# Patient Record
Sex: FEMALE | Race: WHITE | Employment: OTHER | URBAN - METROPOLITAN AREA
[De-identification: names, ages, dates, MRNs, and addresses within clinical notes are randomized per-mention and may not be internally consistent; named-entity substitution may affect disease eponyms.]

---

## 2023-08-08 ENCOUNTER — HOSPITAL ENCOUNTER (EMERGENCY)
Age: 53
Discharge: HOME | End: 2023-08-12
Attending: EMERGENCY MEDICINE
Payer: MEDICAID

## 2023-08-08 DIAGNOSIS — R45.851 SUICIDAL IDEATION: Primary | ICD-10-CM

## 2023-08-08 LAB
ALBUMIN SERPL-MCNC: 4.1 G/DL (ref 3.5–5.2)
ALBUMIN SERPL-MCNC: 4.5 G/DL (ref 3.5–5.2)
ALP SERPL-CCNC: 88 U/L (ref 35–104)
ALP SERPL-CCNC: 88 U/L (ref 35–104)
ALT SERPL-CCNC: 13 U/L (ref 0–32)
ALT SERPL-CCNC: 17 U/L (ref 0–32)
AMPHET UR QL SCN: POSITIVE
ANION GAP SERPL CALCULATED.3IONS-SCNC: 12 MMOL/L (ref 7–16)
ANION GAP SERPL CALCULATED.3IONS-SCNC: 17 MMOL/L (ref 7–16)
APAP SERPL-MCNC: <5 UG/ML (ref 10–30)
AST SERPL-CCNC: 18 U/L (ref 0–31)
AST SERPL-CCNC: 37 U/L (ref 0–31)
BARBITURATES UR QL SCN: NEGATIVE
BASOPHILS # BLD: 0.04 K/UL (ref 0–0.2)
BASOPHILS NFR BLD: 1 % (ref 0–2)
BENZODIAZ UR QL: NEGATIVE
BILIRUB SERPL-MCNC: 0.3 MG/DL (ref 0–1.2)
BILIRUB SERPL-MCNC: 0.4 MG/DL (ref 0–1.2)
BILIRUB UR QL STRIP: NEGATIVE
BUN SERPL-MCNC: 13 MG/DL (ref 6–20)
BUN SERPL-MCNC: 14 MG/DL (ref 6–20)
BUPRENORPHINE UR QL: NEGATIVE
CALCIUM SERPL-MCNC: 9 MG/DL (ref 8.6–10.2)
CALCIUM SERPL-MCNC: 9.1 MG/DL (ref 8.6–10.2)
CANNABINOIDS UR QL SCN: NEGATIVE
CHLORIDE SERPL-SCNC: 107 MMOL/L (ref 98–107)
CHLORIDE SERPL-SCNC: 108 MMOL/L (ref 98–107)
CLARITY UR: CLEAR
CO2 SERPL-SCNC: 17 MMOL/L (ref 22–29)
CO2 SERPL-SCNC: 24 MMOL/L (ref 22–29)
COCAINE UR QL SCN: POSITIVE
COLOR UR: YELLOW
CREAT SERPL-MCNC: 0.6 MG/DL (ref 0.5–1)
CREAT SERPL-MCNC: 0.7 MG/DL (ref 0.5–1)
EKG ATRIAL RATE: 75 BPM
EKG P AXIS: 83 DEGREES
EKG P-R INTERVAL: 172 MS
EKG Q-T INTERVAL: 386 MS
EKG QRS DURATION: 82 MS
EKG QTC CALCULATION (BAZETT): 485 MS
EKG R AXIS: 65 DEGREES
EKG T AXIS: 49 DEGREES
EKG VENTRICULAR RATE: 95 BPM
EOSINOPHIL # BLD: 0.07 K/UL (ref 0.05–0.5)
EOSINOPHILS RELATIVE PERCENT: 2 % (ref 0–6)
ERYTHROCYTE [DISTWIDTH] IN BLOOD BY AUTOMATED COUNT: 12.7 % (ref 11.5–15)
ETHANOLAMINE SERPL-MCNC: 59 MG/DL
FENTANYL UR QL: POSITIVE
GFR SERPL CREATININE-BSD FRML MDRD: >60 ML/MIN/1.73M2
GFR SERPL CREATININE-BSD FRML MDRD: >60 ML/MIN/1.73M2
GLUCOSE SERPL-MCNC: 87 MG/DL (ref 74–99)
GLUCOSE SERPL-MCNC: 99 MG/DL (ref 74–99)
GLUCOSE UR STRIP-MCNC: NEGATIVE MG/DL
HCT VFR BLD AUTO: 43.9 % (ref 34–48)
HGB BLD-MCNC: 14.9 G/DL (ref 11.5–15.5)
HGB UR QL STRIP.AUTO: NEGATIVE
IMM GRANULOCYTES # BLD AUTO: <0.03 K/UL (ref 0–0.58)
IMM GRANULOCYTES NFR BLD: 0 % (ref 0–5)
KETONES UR STRIP-MCNC: NEGATIVE MG/DL
LEUKOCYTE ESTERASE UR QL STRIP: NEGATIVE
LYMPHOCYTES NFR BLD: 1.82 K/UL (ref 1.5–4)
LYMPHOCYTES RELATIVE PERCENT: 39 % (ref 20–42)
MCH RBC QN AUTO: 31.2 PG (ref 26–35)
MCHC RBC AUTO-ENTMCNC: 33.9 G/DL (ref 32–34.5)
MCV RBC AUTO: 92 FL (ref 80–99.9)
METHADONE UR QL: NEGATIVE
MONOCYTES NFR BLD: 0.24 K/UL (ref 0.1–0.95)
MONOCYTES NFR BLD: 5 % (ref 2–12)
NEUTROPHILS NFR BLD: 53 % (ref 43–80)
NEUTS SEG NFR BLD: 2.47 K/UL (ref 1.8–7.3)
NITRITE UR QL STRIP: NEGATIVE
OPIATES UR QL SCN: NEGATIVE
OXYCODONE UR QL SCN: NEGATIVE
PCP UR QL SCN: NEGATIVE
PH UR STRIP: 6 [PH] (ref 5–9)
PLATELET # BLD AUTO: 247 K/UL (ref 130–450)
PMV BLD AUTO: 9.8 FL (ref 7–12)
POTASSIUM SERPL-SCNC: 3.7 MMOL/L (ref 3.5–5)
POTASSIUM SERPL-SCNC: 5.8 MMOL/L (ref 3.5–5)
PROT SERPL-MCNC: 7.1 G/DL (ref 6.4–8.3)
PROT SERPL-MCNC: 7.6 G/DL (ref 6.4–8.3)
PROT UR STRIP-MCNC: NEGATIVE MG/DL
RBC # BLD AUTO: 4.77 M/UL (ref 3.5–5.5)
SALICYLATES SERPL-MCNC: <0.3 MG/DL (ref 0–30)
SODIUM SERPL-SCNC: 141 MMOL/L (ref 132–146)
SODIUM SERPL-SCNC: 144 MMOL/L (ref 132–146)
SP GR UR STRIP: 1.01 (ref 1–1.03)
TEST INFORMATION: ABNORMAL
TOXIC TRICYCLIC SC,BLOOD: NEGATIVE
UROBILINOGEN UR STRIP-ACNC: 0.2 EU/DL (ref 0–1)
WBC OTHER # BLD: 4.7 K/UL (ref 4.5–11.5)

## 2023-08-08 PROCEDURE — 6360000002 HC RX W HCPCS: Performed by: PHYSICIAN ASSISTANT

## 2023-08-08 PROCEDURE — 80143 DRUG ASSAY ACETAMINOPHEN: CPT

## 2023-08-08 PROCEDURE — 96374 THER/PROPH/DIAG INJ IV PUSH: CPT

## 2023-08-08 PROCEDURE — 93005 ELECTROCARDIOGRAM TRACING: CPT | Performed by: EMERGENCY MEDICINE

## 2023-08-08 PROCEDURE — 80053 COMPREHEN METABOLIC PANEL: CPT

## 2023-08-08 PROCEDURE — 99285 EMERGENCY DEPT VISIT HI MDM: CPT

## 2023-08-08 PROCEDURE — 6360000002 HC RX W HCPCS: Performed by: EMERGENCY MEDICINE

## 2023-08-08 PROCEDURE — 80179 DRUG ASSAY SALICYLATE: CPT

## 2023-08-08 PROCEDURE — 6370000000 HC RX 637 (ALT 250 FOR IP): Performed by: EMERGENCY MEDICINE

## 2023-08-08 PROCEDURE — 84703 CHORIONIC GONADOTROPIN ASSAY: CPT

## 2023-08-08 PROCEDURE — G0480 DRUG TEST DEF 1-7 CLASSES: HCPCS

## 2023-08-08 PROCEDURE — 81001 URINALYSIS AUTO W/SCOPE: CPT

## 2023-08-08 PROCEDURE — 85025 COMPLETE CBC W/AUTO DIFF WBC: CPT

## 2023-08-08 PROCEDURE — 93010 ELECTROCARDIOGRAM REPORT: CPT | Performed by: INTERNAL MEDICINE

## 2023-08-08 PROCEDURE — 80307 DRUG TEST PRSMV CHEM ANLYZR: CPT

## 2023-08-08 PROCEDURE — 96375 TX/PRO/DX INJ NEW DRUG ADDON: CPT

## 2023-08-08 RX ORDER — KETOROLAC TROMETHAMINE 30 MG/ML
30 INJECTION, SOLUTION INTRAMUSCULAR; INTRAVENOUS ONCE
Status: COMPLETED | OUTPATIENT
Start: 2023-08-08 | End: 2023-08-08

## 2023-08-08 RX ORDER — LORAZEPAM 2 MG/ML
1 INJECTION INTRAMUSCULAR ONCE
Status: COMPLETED | OUTPATIENT
Start: 2023-08-08 | End: 2023-08-08

## 2023-08-08 RX ORDER — ONDANSETRON 2 MG/ML
4 INJECTION INTRAMUSCULAR; INTRAVENOUS ONCE
Status: COMPLETED | OUTPATIENT
Start: 2023-08-08 | End: 2023-08-08

## 2023-08-08 RX ORDER — ONDANSETRON 4 MG/1
4 TABLET, ORALLY DISINTEGRATING ORAL ONCE
Status: DISCONTINUED | OUTPATIENT
Start: 2023-08-08 | End: 2023-08-08

## 2023-08-08 RX ORDER — ACETAMINOPHEN 325 MG/1
650 TABLET ORAL ONCE
Status: COMPLETED | OUTPATIENT
Start: 2023-08-08 | End: 2023-08-08

## 2023-08-08 RX ORDER — KETOROLAC TROMETHAMINE 30 MG/ML
30 INJECTION, SOLUTION INTRAMUSCULAR; INTRAVENOUS ONCE
Status: DISCONTINUED | OUTPATIENT
Start: 2023-08-08 | End: 2023-08-08

## 2023-08-08 RX ORDER — CLONIDINE HYDROCHLORIDE 0.1 MG/1
0.1 TABLET ORAL ONCE
Status: COMPLETED | OUTPATIENT
Start: 2023-08-08 | End: 2023-08-08

## 2023-08-08 RX ADMIN — ONDANSETRON 4 MG: 2 INJECTION INTRAMUSCULAR; INTRAVENOUS at 05:40

## 2023-08-08 RX ADMIN — LORAZEPAM 1 MG: 2 INJECTION INTRAMUSCULAR; INTRAVENOUS at 19:04

## 2023-08-08 RX ADMIN — CLONIDINE HYDROCHLORIDE 0.1 MG: 0.1 TABLET ORAL at 05:41

## 2023-08-08 RX ADMIN — KETOROLAC TROMETHAMINE 30 MG: 30 INJECTION, SOLUTION INTRAMUSCULAR; INTRAVENOUS at 05:40

## 2023-08-08 RX ADMIN — ACETAMINOPHEN 650 MG: 325 TABLET ORAL at 13:08

## 2023-08-08 ASSESSMENT — LIFESTYLE VARIABLES: HOW OFTEN DO YOU HAVE A DRINK CONTAINING ALCOHOL: MONTHLY OR LESS

## 2023-08-08 ASSESSMENT — PAIN DESCRIPTION - LOCATION
LOCATION: GENERALIZED

## 2023-08-08 ASSESSMENT — PAIN SCALES - GENERAL
PAINLEVEL_OUTOF10: 10

## 2023-08-08 ASSESSMENT — PAIN DESCRIPTION - DESCRIPTORS: DESCRIPTORS: BURNING
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
## 2023-08-08 NOTE — ED NOTES
CO at bedside patient in bed eyes closed, respirations unlabored, no signs of distress     Ivelisse Kaur [place]  08/08/23 3759
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
## 2023-08-08 NOTE — ED NOTES
Patient states she does not have a plan specifically to kill herself but that she just wants to die and doesn't have enough energy to try anymore     Bulmaro Lima RN  08/08/23 4295

## 2023-08-08 NOTE — ED NOTES
Pt is a 45 yo female who presents to the ED reporting daily suicidal thoughts and was pink slipped by the ED physician due to repeated statements that she wants to die and she has nothing to live for.

## 2023-08-08 NOTE — CARE COORDINATION
Social Work/Transition of Care:     Pt presented to the ED for Evaluation pt was pinkslipped by the ED PCP and has signed the consent for TeleHealth. SW called VIANNEY spoke with [facility] notified of pt in the ED and in need Telehealth assessment. BRADY faxed pinkslip and consent, confirmation received.     Electronically signed by Namita Sauceda on 4/0/1997 at 10:55 AM

## 2023-08-09 LAB
CK SERPL-CCNC: 56 U/L (ref 20–180)
HCG UR QL: NEGATIVE

## 2023-08-09 PROCEDURE — 96372 THER/PROPH/DIAG INJ SC/IM: CPT

## 2023-08-09 PROCEDURE — 82550 ASSAY OF CK (CPK): CPT

## 2023-08-09 PROCEDURE — 6370000000 HC RX 637 (ALT 250 FOR IP): Performed by: EMERGENCY MEDICINE

## 2023-08-09 PROCEDURE — 6360000002 HC RX W HCPCS: Performed by: EMERGENCY MEDICINE

## 2023-08-09 RX ORDER — DIPHENHYDRAMINE HCL 25 MG
50 TABLET ORAL EVERY 6 HOURS PRN
Status: DISCONTINUED | OUTPATIENT
Start: 2023-08-09 | End: 2023-08-12 | Stop reason: HOSPADM

## 2023-08-09 RX ORDER — LORAZEPAM 1 MG/1
1 TABLET ORAL ONCE
Status: COMPLETED | OUTPATIENT
Start: 2023-08-09 | End: 2023-08-09

## 2023-08-09 RX ORDER — IBUPROFEN 600 MG/1
600 TABLET ORAL ONCE
Status: COMPLETED | OUTPATIENT
Start: 2023-08-09 | End: 2023-08-09

## 2023-08-09 RX ORDER — LORAZEPAM 1 MG/1
2 TABLET ORAL ONCE
Status: COMPLETED | OUTPATIENT
Start: 2023-08-09 | End: 2023-08-09

## 2023-08-09 RX ORDER — KETOROLAC TROMETHAMINE 30 MG/ML
60 INJECTION, SOLUTION INTRAMUSCULAR; INTRAVENOUS ONCE
Status: COMPLETED | OUTPATIENT
Start: 2023-08-09 | End: 2023-08-09

## 2023-08-09 RX ORDER — ACETAMINOPHEN 500 MG
1000 TABLET ORAL ONCE
Status: COMPLETED | OUTPATIENT
Start: 2023-08-09 | End: 2023-08-09

## 2023-08-09 RX ORDER — NICOTINE 21 MG/24HR
1 PATCH, TRANSDERMAL 24 HOURS TRANSDERMAL DAILY
Status: DISCONTINUED | OUTPATIENT
Start: 2023-08-09 | End: 2023-08-12 | Stop reason: HOSPADM

## 2023-08-09 RX ADMIN — LORAZEPAM 2 MG: 1 TABLET ORAL at 19:19

## 2023-08-09 RX ADMIN — LORAZEPAM 2 MG: 1 TABLET ORAL at 22:41

## 2023-08-09 RX ADMIN — ACETAMINOPHEN 1000 MG: 500 TABLET ORAL at 17:35

## 2023-08-09 RX ADMIN — KETOROLAC TROMETHAMINE 60 MG: 30 INJECTION, SOLUTION INTRAMUSCULAR at 17:34

## 2023-08-09 RX ADMIN — IBUPROFEN 600 MG: 600 TABLET, FILM COATED ORAL at 17:16

## 2023-08-09 RX ADMIN — LORAZEPAM 1 MG: 1 TABLET ORAL at 12:18

## 2023-08-09 RX ADMIN — DIPHENHYDRAMINE HCL 50 MG: 25 TABLET ORAL at 19:19

## 2023-08-09 ASSESSMENT — PAIN DESCRIPTION - ORIENTATION
ORIENTATION: LEFT
ORIENTATION: RIGHT
ORIENTATION: LEFT

## 2023-08-09 ASSESSMENT — PAIN SCALES - GENERAL
PAINLEVEL_OUTOF10: 7
PAINLEVEL_OUTOF10: 9
PAINLEVEL_OUTOF10: 7

## 2023-08-09 ASSESSMENT — PAIN DESCRIPTION - LOCATION
LOCATION: HIP

## 2023-08-09 ASSESSMENT — PAIN DESCRIPTION - DESCRIPTORS: DESCRIPTORS: ACHING

## 2023-08-09 NOTE — ED NOTES
SW was notified of Pt being a self pay with no health insurance.  BRADY will follow up on the protocol for out of state indigent patients, for bed day approval.      Dino Patel, EMELYN, JUDITHW  08/09/23 0136

## 2023-08-09 NOTE — CARE COORDINATION
Social Work/Transition of Care:     BRADY spoke with VIANNEY Duncan to follow up on pt disposition, per Kathy Duncan pt is on the waitlist for [facility] AN AFFILIATE OF [facility] will continue to follow and assist with disposition.     Electronically signed by Eduardo Leal on 3/0/2183 at 11:20 AM

## 2023-08-09 NOTE — ED NOTES
SANE kit given to [employer]. Relinquished custody of SANE kit at this time.        Zena Rosario RN  08/08/23 6294

## 2023-08-09 NOTE — ED NOTES
BRADY asked [place] registration to assist to see if insurance in verified. Tresa Dakin, MSW, [place]  08/09/23 9343    SW confirmed with SEY and SEB registration that Pt has no insurance after [facility].       Tresa Dakin, MSW, [place]  08/09/23 3298

## 2023-08-09 NOTE — ED NOTES
The following labs were labeled with appropriate pt sticker and tubed to lab:     [] Blue     [] Lavender   [] on ice  [x] Green/yellow  [] Green/black [] on ice  [] Juaquin Leech  [] on ice  [] Yellow  [] Red  [] Type/ Screen  [] ABG  [] VBG    [] COVID-19 swab    [] Rapid  [] PCR  [] Flu swab  [] Peds Viral Panel     [] Urine Sample  [] Fecal Sample  [] Pelvic Cultures  [] Blood Cultures  [] X 2  [] STREP Cultures       Suyapa Cadet RN  08/09/23 9583

## 2023-08-09 NOTE — ED NOTES
Pt tearful, states she needs some psych meds. Needs something to calm her down. Feels anxious. Dr. Luceroreld Labs aware.       Avis Otoole  08/09/23 1914

## 2023-08-09 NOTE — ED NOTES
Pt is a self pay with no health insurance and lives out of state in [place]. SW reached out to [facility] at [phone] for bed day approval. BRADY spoke to Carmen and provided demographic information. SW was advised to fax over chart information to [phone]. Awaiting a call back to speak to on call worker. EMELYN Fregoso, [place]  08/09/23 2018    BRADY received a call from on- Cassie Wyman [phone] and was given bed day approval for [facility]. EMELYN Fregoso, [facility]  08/09/23 4510    BRADY reached to [facility] at [phone] ext [phone] to inquire on bed availability. BRADY spoke to Tracey Walter who reported they have a pretty big waiting list. Unable to provide specific number due to not being in the office at this time. Referral information faxed to [phone].       EMELYN Fregoso, [place]  08/09/23 2136

## 2023-08-09 NOTE — ED NOTES
Access Center notified of referral for placement. Awaiting call back.      Floridalma Sarmiento RN  08/08/23 7478

## 2023-08-09 NOTE — ED NOTES
Pt requesting nicotine patch, Dr Mauro Jiménez notified and ordering. Pt resting in bed. Denies other needs.       Christi Garber RN  08/09/23 8408

## 2023-08-09 NOTE — ED NOTES
CALLED [facility] - MARK IS OUT ON THE UNITS, BUT SAVANNAH ADVISED THAT THEIR DOCTOR IS STILL REVIEWING THE CHART.     SAVANNAH ASKED FOR CK LEVEL - I FAXED IT TO [place], [place]  08/09/23 2026

## 2023-08-09 NOTE — ED NOTES
Called [facility] and spoke to Giovana, who advised that pt is under review with the doctor - expecting a call back later today with dispo.         Lorin Spain, LARA  08/09/23 1291

## 2023-08-09 NOTE — ED NOTES
5576 Kit opened  [id number] Exam started, exam completed by this RN, cervical exam done by Kadeem Dixon, [address] [place] [address].   1324 exam ended. This RN remained in patient room until all swabs were dry. Patient tolerated exam well. 1946 this RN left the room with the kit. RN copied necessary papers for patient record. 1950 SANE kit is sealed, [facility] notified.       Glenda Glasgow RN  08/08/23 2026

## 2023-08-10 LAB
APAP SERPL-MCNC: <5 UG/ML (ref 10–30)
ETHANOLAMINE SERPL-MCNC: 175 MG/DL
SALICYLATES SERPL-MCNC: 0.4 MG/DL (ref 0–30)
SARS-COV-2 RDRP RESP QL NAA+PROBE: NOT DETECTED
SPECIMEN DESCRIPTION: NORMAL
TOXIC TRICYCLIC SC,BLOOD: NEGATIVE

## 2023-08-10 PROCEDURE — 6370000000 HC RX 637 (ALT 250 FOR IP): Performed by: EMERGENCY MEDICINE

## 2023-08-10 PROCEDURE — 6370000000 HC RX 637 (ALT 250 FOR IP)

## 2023-08-10 PROCEDURE — 6370000000 HC RX 637 (ALT 250 FOR IP): Performed by: STUDENT IN AN ORGANIZED HEALTH CARE EDUCATION/TRAINING PROGRAM

## 2023-08-10 PROCEDURE — 87635 SARS-COV-2 COVID-19 AMP PRB: CPT

## 2023-08-10 RX ORDER — LANOLIN ALCOHOL/MO/W.PET/CERES
3 CREAM (GRAM) TOPICAL ONCE
Status: COMPLETED | OUTPATIENT
Start: 2023-08-10 | End: 2023-08-10

## 2023-08-10 RX ORDER — METHOCARBAMOL 500 MG/1
500 TABLET, FILM COATED ORAL ONCE
Status: COMPLETED | OUTPATIENT
Start: 2023-08-10 | End: 2023-08-10

## 2023-08-10 RX ORDER — LORAZEPAM 1 MG/1
2 TABLET ORAL ONCE
Status: COMPLETED | OUTPATIENT
Start: 2023-08-10 | End: 2023-08-10

## 2023-08-10 RX ORDER — HYDRALAZINE HYDROCHLORIDE 25 MG/1
25 TABLET, FILM COATED ORAL ONCE
Status: COMPLETED | OUTPATIENT
Start: 2023-08-10 | End: 2023-08-10

## 2023-08-10 RX ORDER — ONDANSETRON 4 MG/1
4 TABLET, ORALLY DISINTEGRATING ORAL ONCE
Status: COMPLETED | OUTPATIENT
Start: 2023-08-10 | End: 2023-08-10

## 2023-08-10 RX ORDER — ACETAMINOPHEN 325 MG/1
650 TABLET ORAL ONCE
Status: COMPLETED | OUTPATIENT
Start: 2023-08-10 | End: 2023-08-10

## 2023-08-10 RX ADMIN — HYDRALAZINE HYDROCHLORIDE 25 MG: 25 TABLET, FILM COATED ORAL at 22:38

## 2023-08-10 RX ADMIN — ONDANSETRON 4 MG: 4 TABLET, ORALLY DISINTEGRATING ORAL at 11:10

## 2023-08-10 RX ADMIN — ACETAMINOPHEN 650 MG: 325 TABLET ORAL at 02:41

## 2023-08-10 RX ADMIN — LORAZEPAM 2 MG: 1 TABLET ORAL at 17:02

## 2023-08-10 RX ADMIN — METHOCARBAMOL 500 MG: 500 TABLET ORAL at 23:37

## 2023-08-10 RX ADMIN — Medication 3 MG: at 23:38

## 2023-08-10 RX ADMIN — DIPHENHYDRAMINE HCL 50 MG: 25 TABLET ORAL at 11:10

## 2023-08-10 ASSESSMENT — PAIN DESCRIPTION - LOCATION
LOCATION: HIP
LOCATION: HIP

## 2023-08-10 ASSESSMENT — PAIN SCALES - GENERAL
PAINLEVEL_OUTOF10: 3
PAINLEVEL_OUTOF10: 9
PAINLEVEL_OUTOF10: 4
PAINLEVEL_OUTOF10: 10

## 2023-08-10 ASSESSMENT — PAIN DESCRIPTION - DESCRIPTORS
DESCRIPTORS: ACHING;DISCOMFORT
DESCRIPTORS: SHARP

## 2023-08-10 ASSESSMENT — PAIN DESCRIPTION - ORIENTATION: ORIENTATION: RIGHT;LEFT

## 2023-08-10 ASSESSMENT — PAIN - FUNCTIONAL ASSESSMENT: PAIN_FUNCTIONAL_ASSESSMENT: 0-10

## 2023-08-10 NOTE — ED NOTES
Updated information sent to [facility] to review.       EMELYN Osborn, [place]  08/10/23 8691

## 2023-08-10 NOTE — CARE COORDINATION
Social Work/Transition of Care:     BRADY spoke with VIANNEY Dailey in addition to [employer] Nurse Notified and will follow up.     Electronically signed by Nicholas Stout on 2/99/0382 at 7:12 PM

## 2023-08-10 NOTE — ED NOTES
I called [facility] to get an update - I left a detailed message requesting a call back with dispo decision     Kvng Anguiano, LARA  08/10/23 0825

## 2023-08-10 NOTE — ED NOTES
Call from Janak Donohue at [facility] ED requesting update on this pt's status. Call to [facility] [phone], ext [phone], spoke with Roel Mata who reports that pt is under review but psychiatrist is concerned about blood pressure readings. Spoke with Janak Donohue at [phone], relayed [facility] concerns about BP, Janak Donohue reports she will address with the ED doc.       [address] [facility],[address], MSW, [address] [place] [address]  08/10/23 9272

## 2023-08-10 NOTE — ED NOTES
Pt resting comfortably. Resp regular. Vitals are stable. Sitter remains at bedside. Pt updated on plan of care.       Georges Vega RN  08/10/23 5980

## 2023-08-10 NOTE — ED NOTES
Call from Patient Navigator Nael Us, requesting to know if this pt might possible have out-of state Medicaid from [place]. Call to KELSI APARICIO [facility] ED, spoke with [facility], requested that pt be asked if she has out-of-state Medicaid. [facility] reports pt states she has Medicare. Requested Carlyn ask Registration to review this pt's insurance status in light of her report she has Medicare. Informed pt navigator of status and request for Registration to review.       [address], [place], [place]  08/10/23 6163

## 2023-08-10 NOTE — ED NOTES
Spoke will BRADY LAIRD [facility] AN AFFILIATE OF [facility] # [id number]- reviewed [facility] Screening. Will have [place] call [facility] for re-eval for placement.      Lexx Kang RN  08/10/23 10 Rajani Collier RN  08/10/23 2243

## 2023-08-10 NOTE — ED NOTES
Report provided to [facility]. Pt continues to await placement. Questions answered during handoff.       Brynn Patricio RN  08/09/23 7095

## 2023-08-10 NOTE — ED NOTES
BRADY reached to [facility] at [phone] ext [phone] to inquire on bed availability. BRADY spoke to Chen Baum who reported their provider reviewed case at 6pm. It was requested that Pt be observed longer for detox symptoms. BRADY was informed this usually is for 24 hours. BRADY updated DARRON Santos.           Shruthi Mota, EMELYN, [facility]  08/10/23 2517

## 2023-08-10 NOTE — ED NOTES
Pt had shower, change of paper gown, new socks and new linens.       Bhavna Ledbetter RN  08/10/23 6345

## 2023-08-10 NOTE — ED NOTES
04:17 Clinical Opiate Withdrawal Scale Clinical Opiate Withdrawal Scale  Resting Pulse rate: 80 beats/min or below  GI upset over last 30 mins: No GI symptoms  Sweating over last 30 mins: No report of chills or flushing  Tremor observed in outreached hands: No tremor  Restlessness observed during assessment: Able to sit still  Yawning observed during assessment: 1 or 2 times  Pupil size: Pinned or normal size for room light  Anxiety or Irritability: None  Bone or joint aches: Mild diffuse discomfort (Pt states she needs a hip replacement. )  Gooseflesh skin: Skin is smooth  Running Nose or tearing: Not present  Clinical Opiate Withdrawal Scale Score: 2     06:10 Clinical Opiate Withdrawal Scale Clinical Opiate Withdrawal Scale  Resting Pulse rate: 80 beats/min or below  GI upset over last 30 mins: No GI symptoms  Sweating over last 30 mins: No report of chills or flushing  Tremor observed in outreached hands: No tremor  Restlessness observed during assessment: Able to sit still  Yawning observed during assessment: 1 or 2 times  Pupil size: Pinned or normal size for room light  Anxiety or Irritability: None  Bone or joint aches: Not present  Gooseflesh skin: Skin is smooth  Running Nose or tearing: Not present  Clinical Opiate Withdrawal Scale Score: 481 [place], [place]  08/10/23 0211

## 2023-08-10 NOTE — CARE COORDINATION
Social Work/Transition of Care:     UC received a call from [facility] requesting updated information, pt to be reviewed with Psychiatrist.    Electronically signed by LARA Gillette on 0/67/1787 at 1:21 PM     1700 SW called VIANNEY spoke to [facility] requested update on pts disposition to [facility].     Electronically signed by Ihsan Esquivel on 9/96/1675 at 5:05 PM

## 2023-08-10 NOTE — ED NOTES
Pt resting on bed, a+o x4, skin pink, warm dry, respirations even non labored. Pt denies needs. call light within reach.        Jayda Escobar RN  08/09/23 2049

## 2023-08-11 PROCEDURE — 6370000000 HC RX 637 (ALT 250 FOR IP): Performed by: EMERGENCY MEDICINE

## 2023-08-11 PROCEDURE — 6370000000 HC RX 637 (ALT 250 FOR IP): Performed by: STUDENT IN AN ORGANIZED HEALTH CARE EDUCATION/TRAINING PROGRAM

## 2023-08-11 PROCEDURE — 6360000002 HC RX W HCPCS: Performed by: PSYCHIATRY & NEUROLOGY

## 2023-08-11 PROCEDURE — 6360000002 HC RX W HCPCS: Performed by: STUDENT IN AN ORGANIZED HEALTH CARE EDUCATION/TRAINING PROGRAM

## 2023-08-11 RX ORDER — BUPRENORPHINE HYDROCHLORIDE 8 MG/1
8 TABLET SUBLINGUAL 2 TIMES DAILY
Status: DISCONTINUED | OUTPATIENT
Start: 2023-08-12 | End: 2023-08-12

## 2023-08-11 RX ORDER — DICYCLOMINE HYDROCHLORIDE 10 MG/1
20 CAPSULE ORAL EVERY 6 HOURS PRN
Status: DISCONTINUED | OUTPATIENT
Start: 2023-08-11 | End: 2023-08-12 | Stop reason: HOSPADM

## 2023-08-11 RX ORDER — BUPRENORPHINE HYDROCHLORIDE AND NALOXONE HYDROCHLORIDE DIHYDRATE 2; .5 MG/1; MG/1
2 TABLET SUBLINGUAL PRN
Status: DISCONTINUED | OUTPATIENT
Start: 2023-08-11 | End: 2023-08-11

## 2023-08-11 RX ORDER — CLONIDINE HYDROCHLORIDE 0.1 MG/1
0.1 TABLET ORAL EVERY 6 HOURS PRN
Status: DISCONTINUED | OUTPATIENT
Start: 2023-08-11 | End: 2023-08-12 | Stop reason: HOSPADM

## 2023-08-11 RX ORDER — BUPRENORPHINE HYDROCHLORIDE 8 MG/1
8 TABLET SUBLINGUAL 2 TIMES DAILY
Status: DISCONTINUED | OUTPATIENT
Start: 2023-08-11 | End: 2023-08-11

## 2023-08-11 RX ORDER — METHOCARBAMOL 750 MG/1
750 TABLET, FILM COATED ORAL EVERY 6 HOURS PRN
Status: DISCONTINUED | OUTPATIENT
Start: 2023-08-11 | End: 2023-08-12 | Stop reason: HOSPADM

## 2023-08-11 RX ORDER — LORAZEPAM 1 MG/1
2 TABLET ORAL ONCE
Status: COMPLETED | OUTPATIENT
Start: 2023-08-11 | End: 2023-08-11

## 2023-08-11 RX ORDER — LOPERAMIDE HYDROCHLORIDE 2 MG/1
2 CAPSULE ORAL 4 TIMES DAILY PRN
Status: DISCONTINUED | OUTPATIENT
Start: 2023-08-11 | End: 2023-08-12 | Stop reason: HOSPADM

## 2023-08-11 RX ORDER — LANOLIN ALCOHOL/MO/W.PET/CERES
3 CREAM (GRAM) TOPICAL NIGHTLY
Status: DISCONTINUED | OUTPATIENT
Start: 2023-08-11 | End: 2023-08-12 | Stop reason: HOSPADM

## 2023-08-11 RX ADMIN — CLONIDINE HYDROCHLORIDE 0.1 MG: 0.1 TABLET ORAL at 03:37

## 2023-08-11 RX ADMIN — BUPRENORPHINE HCL 8 MG: 8 TABLET SUBLINGUAL at 15:06

## 2023-08-11 RX ADMIN — LORAZEPAM 2 MG: 1 TABLET ORAL at 22:44

## 2023-08-11 RX ADMIN — BUPRENORPHINE HYDROCHLORIDE AND NALOXONE HYDROCHLORIDE DIHYDRATE 2 TABLET: 2; .5 TABLET SUBLINGUAL at 03:37

## 2023-08-11 RX ADMIN — DICYCLOMINE HYDROCHLORIDE 20 MG: 10 CAPSULE ORAL at 03:38

## 2023-08-11 RX ADMIN — DIPHENHYDRAMINE HCL 50 MG: 25 TABLET ORAL at 03:37

## 2023-08-11 ASSESSMENT — PAIN - FUNCTIONAL ASSESSMENT
PAIN_FUNCTIONAL_ASSESSMENT: 0-10
PAIN_FUNCTIONAL_ASSESSMENT: 0-10

## 2023-08-11 ASSESSMENT — LIFESTYLE VARIABLES
HOW MANY STANDARD DRINKS CONTAINING ALCOHOL DO YOU HAVE ON A TYPICAL DAY: 1 OR 2
HOW OFTEN DO YOU HAVE A DRINK CONTAINING ALCOHOL: MONTHLY OR LESS

## 2023-08-11 ASSESSMENT — PAIN SCALES - GENERAL
PAINLEVEL_OUTOF10: 10
PAINLEVEL_OUTOF10: 5

## 2023-08-11 ASSESSMENT — PAIN DESCRIPTION - LOCATION
LOCATION: HIP
LOCATION: HIP

## 2023-08-11 ASSESSMENT — PAIN DESCRIPTION - ORIENTATION
ORIENTATION: RIGHT
ORIENTATION: RIGHT

## 2023-08-11 ASSESSMENT — PAIN DESCRIPTION - PAIN TYPE: TYPE: CHRONIC PAIN

## 2023-08-11 ASSESSMENT — PAIN DESCRIPTION - DESCRIPTORS: DESCRIPTORS: ACHING;DISCOMFORT

## 2023-08-11 NOTE — CARE COORDINATION
Peer Recovery Support Note    Name: Dilia David  Date: 8/11/2023    Chief Complaint   Patient presents with    Hypertension     154/110 for EMS       Peer Support met with patient. [x] Support and education provided  [] Resources provided   [] Treatment referral:   [] Other:   [] Patient declined peer recovery services     Referred By: Jessica Zamora. Notes: Peer gave support to patient. She didn't know if she had her phone with her when she came in. All belonging were at the nurses station. Another staff member and peer went through her things to hopefully find her phone which we did not. Out of State insurance (medicaid) and no identification on her.      Elma Borjas, 8/11/2023

## 2023-08-11 NOTE — CARE COORDINATION
Social Work/Transition of Care:     BRADY spoke with Jena Kingston Peer support of pt in the ED.     Electronically signed by Ilda Johnson on 6/16/5075 at 10:35 AM

## 2023-08-11 NOTE — ED NOTES
Patient resting in bed with eyes closed. No signs of distress. 1:1 sitter remains at bedside.       Mel Blanc RN  08/11/23 9115

## 2023-08-11 NOTE — ED NOTES
Patient sleeping. Arouses easily. C/O sitting outside the room.  Continue to watch     Pool MondayDARRON  08/11/23 1364

## 2023-08-11 NOTE — ED NOTES
Took patient phone to talk to Dr Luc Mora. Patient calm and cooperative.  C/O sitting outside room     Sandro Jewell RN  08/11/23 1702

## 2023-08-11 NOTE — ED NOTES
Updated mom jeff on visit- listed in account. Gave some history drug use d/t health issue hip problem, prescribed pain medication and snow balled.  Her contact is in account [id number], RN  08/10/23 8556

## 2023-08-11 NOTE — ED NOTES
I CALLED [facility] AND SPOKE TO LUIZ TO CANCEL [address] [facility] AND UPDATED [address] [facility]  08/11/23 0972

## 2023-08-11 NOTE — CARE COORDINATION
Social Work/Transition of Care:     Pt in need of Treatment facility, ED SW along with peer support have initiated a referral to [facility] in [facility]# [phone] opt. 1,  SW faxed information to no. Provided [phone] for review, SW spoke with [place] who will contact ED if they are able to accept pt, facility will also provide transport. ED Charge Nurse notified.     Electronically signed by Letitia Reyes on 0/06/5300 at 5:48 PM

## 2023-08-11 NOTE — ED NOTES
Assumed care of patient at 7:30. Introduced self to patient as RN. Patient sleeping, easily aroused. Compliains of right hip pain 10/10. Patient denies any HI/SI. Skin warm /dry. No signs of withdrawals noted. C/O sitting outside room.      Iris Beckman RN  08/11/23 189 Rosario Cartwright RN  08/11/23 6603
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
## 2023-08-11 NOTE — ED NOTES
Spoke with Dr. Paxton Barlow about sitter need. Read through Dr. Kitchen Moni note and dicussed pink slip expiring. Patient no longer has sitter need. Sitter removed from bed side.        Jacky Guaman RN  08/11/23 0916

## 2023-08-11 NOTE — ED NOTES
Patient lying left side in bed. Quiet and awake. Respirations equal and unlabored. No signs of distress. 1:1 remains at bedside.       Jolene Piedra RN  08/11/23 0300

## 2023-08-11 NOTE — ED NOTES
Constant observer at bedside at all times. Patient appears to be resting quietly.      1210  27 N, RN  08/11/23 9096

## 2023-08-11 NOTE — ED NOTES
Received call from access [facility]- staff reports not able to refer to [facility] must have  or reach direct. Rn thanks access [facility] and call ends.      Magdy Figueroa RN  08/10/23 1567

## 2023-08-11 NOTE — ED NOTES
I SPOKE WITH SUMMER AT [facility] AND REQUESTED TO HAVE A PSYCH CONSULT PLACED ON THIS PT.    I CALLED [facility] AND SPOKE TO [address] [place] [address] WHO REQUESTED UPDATED VITALS RECENT COWS - Ovidio Price RN AWARE - I WILL FAX IT ONCE IT'S [address] [place], [place]  08/11/23 9257

## 2023-08-11 NOTE — ED NOTES
Pt laying on floor complaining of insomnia. Requesting shower. This nurse assisted pt to shower. Provided hygiene products.  Clean linens      Sharon An RN  08/11/23 9129

## 2023-08-11 NOTE — ED NOTES
Called access center for update, reviewed new list for insurance. Request reach out to [facility] with update on vitals and insurance.      Patrice Ernst RN  08/10/23 8778

## 2023-08-11 NOTE — ED NOTES
Constant observer remains at bedside.  No change in patient status     1210  27 N, RN  08/11/23 4396

## 2023-08-11 NOTE — ED NOTES
Patient denies feeling Homicidal or Suicidal but feels \"hopeless\". Has no where to go and has no purpose. Also, states needs to have a right total hip replacement.        1210  27 N, RN  08/10/23 8896

## 2023-08-11 NOTE — ED NOTES
Report received from [facility]. Patient lying on her left side awake & quiet in bed. Vitals updated on patient. Patient provided with [place], as requested, and ginger ale. 1:1 sitter remains at bedside.      Geneva Suarez RN  08/10/23 5808

## 2023-08-11 NOTE — ED NOTES
Pt laying in bed, cooperative. Complaining of hip pain and withdraw. New sheets and clothing provided as patient had an episode of loose stools.      Mavis Garcia RN  08/11/23 5905

## 2023-08-11 NOTE — CARE COORDINATION
Social Work/Transition of Care:     BRADY consulted to assist with Substance Abuse Treatment, Peer Support notified of pt no longer in need of inpatient mental health and can be dispositioned for treatment. Pt currently has out of State Medicaid BRADY contacted [facility] to request assistance with obtaining information to assist pt with obtaining [address] [facility], Left message. Electronically signed by Sonia Segura on 8/36/4574 at 12:59 PM       18 SW spoke with Mika Burger., PRS pt considered a [facility] Self Pay, [address] contracts with  [employer] for Detox,  currently no beds available. Pt can apply for [facility] Medicaid and if a pending number is given [facility] will accept pt today, pt must needs to be at facility by 3:00 pm.  BRADY received a return call from [facility] [address], pt will need to cancel her [place] benefits in order to get approval for [facility] Medicaid. Per [place] this process will take more than 30 minutes, [place] will require proof that pts [facility] has terminated. BRADY called peer support for any additional options.     Electronically signed by Sonia Segura on 0/03/2944 at 1:52 PM

## 2023-08-11 NOTE — ED NOTES
Patient appears to be sleeping comfortably. Respirations easy and eyes are closed. Skin is warm and dry. Constant observer at bedside at all times.      1210  27 N, RN  08/11/23 9305

## 2023-08-11 NOTE — ED NOTES
Was requested to re-assess pt, interviewed pt via tele assessment. Pt is a 47 yo female who presents to the ED on a pink slip, multiple statements to ED physician that she wants to die. Pt reports to  this date that she does have suicidal thoughts multiple times a day, much of it due to physical pain, but also due to life circumstances, denies specific plan at this time. .  Reports she has a long hx of fentanyl addiction, transient living, also reports a long psych hx with multiple dx: PTSD, MDD, QUANG as well as addiction issues. Mental status: Alert, oriented x3, knows it is [facility] July 3855\" but uncertain about specific date, mood depressed, affect severely restricted, eye contact fair, speech normal in rate flow and volume, behavior is appropriate, cooperative, no signs of agitation, does report being in severe pain, thought form logical, organized, thought content clear, denies A/V hallucinations, delusions, paranoia, none evident in interview. Does report suicidal ideation, unsure about level of intent, reports no plan, reports hx of multiple attempts in the past including trying to hang herself, denies homicidal ideation intent or plan, judgement appears poor, limited insight.      Conclusion: Pt in need of dual dx treatment or at minimum psychiatric in-patient to stabilize mood and suicidal ideation then referral to D&A program.      Kath Nolasco, EMELYN, LSW  08/10/23 9729

## 2023-08-12 VITALS
SYSTOLIC BLOOD PRESSURE: 144 MMHG | TEMPERATURE: 98.1 F | DIASTOLIC BLOOD PRESSURE: 92 MMHG | HEART RATE: 61 BPM | WEIGHT: 150 LBS | BODY MASS INDEX: 23.54 KG/M2 | OXYGEN SATURATION: 99 % | RESPIRATION RATE: 16 BRPM | HEIGHT: 67 IN

## 2023-08-12 PROCEDURE — 6360000002 HC RX W HCPCS: Performed by: STUDENT IN AN ORGANIZED HEALTH CARE EDUCATION/TRAINING PROGRAM

## 2023-08-12 PROCEDURE — 6370000000 HC RX 637 (ALT 250 FOR IP): Performed by: STUDENT IN AN ORGANIZED HEALTH CARE EDUCATION/TRAINING PROGRAM

## 2023-08-12 PROCEDURE — 6370000000 HC RX 637 (ALT 250 FOR IP): Performed by: PSYCHIATRY & NEUROLOGY

## 2023-08-12 PROCEDURE — 6370000000 HC RX 637 (ALT 250 FOR IP): Performed by: EMERGENCY MEDICINE

## 2023-08-12 RX ORDER — BUPRENORPHINE HYDROCHLORIDE 8 MG/1
8 TABLET SUBLINGUAL 2 TIMES DAILY
Status: DISCONTINUED | OUTPATIENT
Start: 2023-08-12 | End: 2023-08-12 | Stop reason: HOSPADM

## 2023-08-12 RX ADMIN — BUPRENORPHINE HCL 8 MG: 8 TABLET SUBLINGUAL at 03:24

## 2023-08-12 RX ADMIN — METHOCARBAMOL TABLETS 750 MG: 750 TABLET, COATED ORAL at 12:02

## 2023-08-12 RX ADMIN — SERTRALINE HYDROCHLORIDE 25 MG: 50 TABLET ORAL at 09:18

## 2023-08-12 ASSESSMENT — PAIN DESCRIPTION - DESCRIPTORS: DESCRIPTORS: ACHING

## 2023-08-12 ASSESSMENT — PAIN - FUNCTIONAL ASSESSMENT: PAIN_FUNCTIONAL_ASSESSMENT: 0-10

## 2023-08-12 ASSESSMENT — PAIN DESCRIPTION - ORIENTATION: ORIENTATION: RIGHT

## 2023-08-12 ASSESSMENT — PAIN DESCRIPTION - LOCATION: LOCATION: HIP

## 2023-08-12 ASSESSMENT — PAIN SCALES - GENERAL: PAINLEVEL_OUTOF10: 10

## 2023-08-12 NOTE — ED NOTES
Have checked room x 3 to assess pt since taking assignment at 1100, pt not in room, charge nurse notified.       Avis Costa  08/12/23 9626

## 2023-08-12 NOTE — ED NOTES
Nursing offered pt fresh linens for her stretcher. Pt refused.      [address] [place], [place]  08/11/23 5924

## 2023-08-12 NOTE — ED NOTES
Spoke with  who will come talk to patient regarding possible placement options.       Libertad Cash RN  08/12/23 1467

## 2023-08-12 NOTE — ED NOTES
Patient currently talking to Ivana Medellin from [facility] regarding placement.       Mello Loyd RN  08/12/23 5254

## 2023-08-12 NOTE — ED NOTES
[place] a noise of pounding, upon entering rom patient was hitting hands on wall and visually upset, brandt mayo and Danielle along with officer went to room, brandt barnes ask patient what was wrong, she states everyone forgot about her. She states she wanted medication. After review of mar, noted suboxone was given for 2 x daily - last dose 3pm so next dosage not due until 3am, spoke with dr Tucker Musa, gave 1 time order for po dose of ativan 2mg. Patient also expressed need of for abdomen binder, given for use of hernia.      Ruth Montano RN  08/11/23 6640

## 2023-08-12 NOTE — ED NOTES
Spoke with Crystal at [facility] regarding an update. She is going to look into it and call me back. [id number]- Call back received. Patient does not meet criteria for acceptance.       Alfornia Barthel, RN  08/12/23 9424

## 2023-08-12 NOTE — ED NOTES
Patient is hemodynamically stable here in the emergency department. Patient was evaluated by social work and accepted to the [facility] facility. Transportation was arranged and the patient was discharged to [facility].      Salvador Carlson MD  08/12/23 7248

## 2023-08-12 NOTE — ED NOTES
Call placed to Renata Martinez the on call  regarding patient disposition. Awaiting call back.       Caro Weinberg RN  08/12/23 4157

## 2023-08-12 NOTE — CARE COORDINATION
8/12/2023  Social Work Discharge Planning:SW received a call from nurse informing that she called [facility] and they said Pt doesn't have criteria to accept. Sw reached out to [facility] and left a voicemail regarding this Pt who they had seen yesterday and to inquire of any possible options for Pt. Electronically signed by LARA Carrillo on 8/12/2023 at 11:53 AM    8/12/2023  Social Work Discharge Planning:VAL Salazarlcie Sever returned this workers call and said [facility] will be calling to hopefully get Pt to their facility. BRADY received a call from Astrid Tracy with [facility] who would like to speak to Pt. BRADY referred Astrid Tracy to the ER nurse. lectronically signed by LARA Carrillo on 8/12/2023 at 12:23 PM

## 2023-09-07 ENCOUNTER — HOSPITAL ENCOUNTER (EMERGENCY)
Age: 53
Discharge: HOME | End: 2023-09-07
Attending: STUDENT IN AN ORGANIZED HEALTH CARE EDUCATION/TRAINING PROGRAM
Payer: MEDICAID

## 2023-09-07 ENCOUNTER — APPOINTMENT (OUTPATIENT)
Dept: GENERAL RADIOLOGY | Age: 53
End: 2023-09-07
Payer: MEDICAID

## 2023-09-07 VITALS
SYSTOLIC BLOOD PRESSURE: 176 MMHG | OXYGEN SATURATION: 97 % | HEART RATE: 69 BPM | RESPIRATION RATE: 20 BRPM | DIASTOLIC BLOOD PRESSURE: 102 MMHG | TEMPERATURE: 98 F

## 2023-09-07 DIAGNOSIS — M25.551 CHRONIC HIP PAIN, RIGHT: Primary | ICD-10-CM

## 2023-09-07 DIAGNOSIS — G89.29 CHRONIC HIP PAIN, RIGHT: Primary | ICD-10-CM

## 2023-09-07 DIAGNOSIS — R03.0 ELEVATED BLOOD-PRESSURE READING WITHOUT DIAGNOSIS OF HYPERTENSION: ICD-10-CM

## 2023-09-07 LAB
ANION GAP SERPL CALCULATED.3IONS-SCNC: 12 MMOL/L (ref 7–16)
BASOPHILS # BLD: 0.04 K/UL (ref 0–0.2)
BASOPHILS NFR BLD: 1 % (ref 0–2)
BUN SERPL-MCNC: 23 MG/DL (ref 6–20)
CALCIUM SERPL-MCNC: 9.5 MG/DL (ref 8.6–10.2)
CHLORIDE SERPL-SCNC: 106 MMOL/L (ref 98–107)
CO2 SERPL-SCNC: 24 MMOL/L (ref 22–29)
CREAT SERPL-MCNC: 0.7 MG/DL (ref 0.5–1)
EOSINOPHIL # BLD: 0.2 K/UL (ref 0.05–0.5)
EOSINOPHILS RELATIVE PERCENT: 6 % (ref 0–6)
ERYTHROCYTE [DISTWIDTH] IN BLOOD BY AUTOMATED COUNT: 12.6 % (ref 11.5–15)
GFR SERPL CREATININE-BSD FRML MDRD: >60 ML/MIN/1.73M2
GLUCOSE SERPL-MCNC: 95 MG/DL (ref 74–99)
HCT VFR BLD AUTO: 41.3 % (ref 34–48)
HGB BLD-MCNC: 14 G/DL (ref 11.5–15.5)
IMM GRANULOCYTES # BLD AUTO: <0.03 K/UL (ref 0–0.58)
IMM GRANULOCYTES NFR BLD: 0 % (ref 0–5)
LYMPHOCYTES NFR BLD: 1.32 K/UL (ref 1.5–4)
LYMPHOCYTES RELATIVE PERCENT: 40 % (ref 20–42)
MCH RBC QN AUTO: 31.5 PG (ref 26–35)
MCHC RBC AUTO-ENTMCNC: 33.9 G/DL (ref 32–34.5)
MCV RBC AUTO: 93 FL (ref 80–99.9)
MONOCYTES NFR BLD: 0.26 K/UL (ref 0.1–0.95)
MONOCYTES NFR BLD: 8 % (ref 2–12)
NEUTROPHILS NFR BLD: 45 % (ref 43–80)
NEUTS SEG NFR BLD: 1.51 K/UL (ref 1.8–7.3)
PLATELET # BLD AUTO: 243 K/UL (ref 130–450)
PMV BLD AUTO: 9.1 FL (ref 7–12)
POTASSIUM SERPL-SCNC: 4.9 MMOL/L (ref 3.5–5)
RBC # BLD AUTO: 4.44 M/UL (ref 3.5–5.5)
SODIUM SERPL-SCNC: 142 MMOL/L (ref 132–146)
WBC OTHER # BLD: 3.3 K/UL (ref 4.5–11.5)

## 2023-09-07 PROCEDURE — 73502 X-RAY EXAM HIP UNI 2-3 VIEWS: CPT

## 2023-09-07 PROCEDURE — 80048 BASIC METABOLIC PNL TOTAL CA: CPT

## 2023-09-07 PROCEDURE — 99285 EMERGENCY DEPT VISIT HI MDM: CPT

## 2023-09-07 PROCEDURE — 93005 ELECTROCARDIOGRAM TRACING: CPT | Performed by: STUDENT IN AN ORGANIZED HEALTH CARE EDUCATION/TRAINING PROGRAM

## 2023-09-07 PROCEDURE — 6370000000 HC RX 637 (ALT 250 FOR IP): Performed by: STUDENT IN AN ORGANIZED HEALTH CARE EDUCATION/TRAINING PROGRAM

## 2023-09-07 PROCEDURE — 85025 COMPLETE CBC W/AUTO DIFF WBC: CPT

## 2023-09-07 RX ORDER — CYCLOBENZAPRINE HCL 5 MG
10 TABLET ORAL ONCE
Status: COMPLETED | OUTPATIENT
Start: 2023-09-07 | End: 2023-09-07

## 2023-09-07 RX ORDER — CYCLOBENZAPRINE HCL 10 MG
10 TABLET ORAL 3 TIMES DAILY PRN
Qty: 21 TABLET | Refills: 0 | Status: SHIPPED | OUTPATIENT
Start: 2023-09-07 | End: 2023-09-17

## 2023-09-07 RX ADMIN — PREDNISONE 50 MG: 20 TABLET ORAL at 15:51

## 2023-09-07 RX ADMIN — CYCLOBENZAPRINE HYDROCHLORIDE 10 MG: 5 TABLET, FILM COATED ORAL at 15:51

## 2023-09-07 ASSESSMENT — ENCOUNTER SYMPTOMS
ABDOMINAL PAIN: 0
NAUSEA: 0

## 2023-09-07 ASSESSMENT — PAIN DESCRIPTION - LOCATION: LOCATION: HIP;LEG

## 2023-09-07 ASSESSMENT — PAIN - FUNCTIONAL ASSESSMENT: PAIN_FUNCTIONAL_ASSESSMENT: 0-10

## 2023-09-07 ASSESSMENT — PAIN DESCRIPTION - ORIENTATION: ORIENTATION: RIGHT

## 2023-09-07 ASSESSMENT — PAIN SCALES - GENERAL: PAINLEVEL_OUTOF10: 8

## 2023-09-07 NOTE — DISCHARGE INSTRUCTIONS
Call [address] line to establish primary care physician for elevated blood pressure. Call Dr. Eva Munguia for osteoarthritis of hip.

## 2023-09-08 LAB
EKG ATRIAL RATE: 70 BPM
EKG P AXIS: 65 DEGREES
EKG P-R INTERVAL: 168 MS
EKG Q-T INTERVAL: 400 MS
EKG QRS DURATION: 74 MS
EKG QTC CALCULATION (BAZETT): 432 MS
EKG R AXIS: 48 DEGREES
EKG T AXIS: 48 DEGREES
EKG VENTRICULAR RATE: 70 BPM

## 2023-09-08 PROCEDURE — 93010 ELECTROCARDIOGRAM REPORT: CPT | Performed by: INTERNAL MEDICINE

## 2023-09-15 ENCOUNTER — APPOINTMENT (OUTPATIENT)
Dept: CT IMAGING | Age: 53
End: 2023-09-15

## 2023-09-15 ENCOUNTER — HOSPITAL ENCOUNTER (EMERGENCY)
Age: 53
Discharge: HOME | End: 2023-09-15
Attending: EMERGENCY MEDICINE

## 2023-09-15 VITALS
SYSTOLIC BLOOD PRESSURE: 128 MMHG | HEART RATE: 80 BPM | TEMPERATURE: 98.1 F | DIASTOLIC BLOOD PRESSURE: 98 MMHG | RESPIRATION RATE: 16 BRPM | OXYGEN SATURATION: 98 %

## 2023-09-15 DIAGNOSIS — M25.551 RIGHT HIP PAIN: Primary | ICD-10-CM

## 2023-09-15 LAB
ANION GAP SERPL CALCULATED.3IONS-SCNC: 12 MMOL/L (ref 7–16)
BASOPHILS # BLD: 0.05 K/UL (ref 0–0.2)
BASOPHILS NFR BLD: 1 % (ref 0–2)
BUN SERPL-MCNC: 19 MG/DL (ref 6–20)
CALCIUM SERPL-MCNC: 9.7 MG/DL (ref 8.6–10.2)
CHLORIDE SERPL-SCNC: 97 MMOL/L (ref 98–107)
CO2 SERPL-SCNC: 22 MMOL/L (ref 22–29)
CREAT SERPL-MCNC: 0.7 MG/DL (ref 0.5–1)
EOSINOPHIL # BLD: 0.31 K/UL (ref 0.05–0.5)
EOSINOPHILS RELATIVE PERCENT: 5 % (ref 0–6)
ERYTHROCYTE [DISTWIDTH] IN BLOOD BY AUTOMATED COUNT: 12.3 % (ref 11.5–15)
GFR SERPL CREATININE-BSD FRML MDRD: >60 ML/MIN/1.73M2
GLUCOSE SERPL-MCNC: 99 MG/DL (ref 74–99)
HCT VFR BLD AUTO: 47.6 % (ref 34–48)
HGB BLD-MCNC: 16.1 G/DL (ref 11.5–15.5)
IMM GRANULOCYTES # BLD AUTO: <0.03 K/UL (ref 0–0.58)
IMM GRANULOCYTES NFR BLD: 0 % (ref 0–5)
LYMPHOCYTES NFR BLD: 1.84 K/UL (ref 1.5–4)
LYMPHOCYTES RELATIVE PERCENT: 29 % (ref 20–42)
MCH RBC QN AUTO: 31.6 PG (ref 26–35)
MCHC RBC AUTO-ENTMCNC: 33.8 G/DL (ref 32–34.5)
MCV RBC AUTO: 93.3 FL (ref 80–99.9)
MONOCYTES NFR BLD: 0.6 K/UL (ref 0.1–0.95)
MONOCYTES NFR BLD: 9 % (ref 2–12)
NEUTROPHILS NFR BLD: 56 % (ref 43–80)
NEUTS SEG NFR BLD: 3.62 K/UL (ref 1.8–7.3)
PLATELET # BLD AUTO: 257 K/UL (ref 130–450)
PMV BLD AUTO: 9.3 FL (ref 7–12)
POTASSIUM SERPL-SCNC: 3.6 MMOL/L (ref 3.5–5)
RBC # BLD AUTO: 5.1 M/UL (ref 3.5–5.5)
SODIUM SERPL-SCNC: 131 MMOL/L (ref 132–146)
WBC OTHER # BLD: 6.4 K/UL (ref 4.5–11.5)

## 2023-09-15 PROCEDURE — 96372 THER/PROPH/DIAG INJ SC/IM: CPT

## 2023-09-15 PROCEDURE — 85025 COMPLETE CBC W/AUTO DIFF WBC: CPT

## 2023-09-15 PROCEDURE — 6360000002 HC RX W HCPCS

## 2023-09-15 PROCEDURE — 99284 EMERGENCY DEPT VISIT MOD MDM: CPT

## 2023-09-15 PROCEDURE — 80048 BASIC METABOLIC PNL TOTAL CA: CPT

## 2023-09-15 PROCEDURE — 73700 CT LOWER EXTREMITY W/O DYE: CPT

## 2023-09-15 RX ORDER — ORPHENADRINE CITRATE 30 MG/ML
60 INJECTION INTRAMUSCULAR; INTRAVENOUS ONCE
Status: COMPLETED | OUTPATIENT
Start: 2023-09-15 | End: 2023-09-15

## 2023-09-15 RX ORDER — KETOROLAC TROMETHAMINE 30 MG/ML
30 INJECTION, SOLUTION INTRAMUSCULAR; INTRAVENOUS ONCE
Status: COMPLETED | OUTPATIENT
Start: 2023-09-15 | End: 2023-09-15

## 2023-09-15 RX ADMIN — ORPHENADRINE CITRATE 60 MG: 60 INJECTION INTRAMUSCULAR; INTRAVENOUS at 17:15

## 2023-09-15 RX ADMIN — KETOROLAC TROMETHAMINE 30 MG: 30 INJECTION, SOLUTION INTRAMUSCULAR; INTRAVENOUS at 17:14

## 2023-09-15 NOTE — ED PROVIDER NOTES
[address] [facility] [address] ENCOUNTER        Pt Name: Mila Harris  MRN: [id number]  [address] [facility] [place] 1970  Date of evaluation: 9/15/2023  Provider: Viktoria Pat MD  PCP: No primary care provider on file. Note Started: 4:38 PM EDT 9/15/23    CHIEF COMPLAINT       Chief Complaint   Patient presents with    Hip Pain     Brought in from [facility] for hip pain x1 yr. Denies any new injury       HISTORY OF PRESENT ILLNESS: 1 or more Elements   History From: patient    Limitations to history : None    Mila Harris is a 46 y.o. female who presents for right hip pain that has been present for about a year. The patient was last seen in the hospital for similar symptoms on 9/7/2023. She had an x-ray of the right hip that showed suspicion for avascular necrosis. She was given follow-up with orthopedic surgery however states she did not follow-up. She states she saw a doctor today who recommended she come to the ED for further evaluation. Patient states she is unable to walk due to her right hip pain. Denies fever, headache, dizziness, chest pain, shortness of breath, abdominal pain, nausea, vomiting, back pain, left leg pain, bowel/bladder dysfunction, saddle anesthesia, recent IV drug use, recent back procedures. Nursing Notes were all reviewed and agreed with or any disagreements were addressed in the HPI. REVIEW OF EXTERNAL NOTE :       Patient was last seen in the ED on 9/7/2023. 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 Her right hip pain has been present for several years. REVIEW OF SYSTEMS :           Positives and Pertinent negatives as per HPI. SURGICAL HISTORY   No past surgical history on file.     [address] [facility]       Discharge Medication List as of 9/15/2023 10:51 PM        CONTINUE these medications which have NOT CHANGED    Details   cyclobenzaprine (FLEXERIL) 10 MG tablet Take 1 to be included for SEP-1 Core Measure due to: Infection is not suspected        Medical Decision Making/Differential Diagnosis:    CC/HPI Summary, Social Determinants of health, Records Reviewed, DDx, testing done/not done, ED Course, Reassessment, disposition considerations/shared decision making with patient, consults, disposition:          She is a 65-year-old female presenting for right hip pain that has been present for a year. Patient was last seen in the emergency department for similar symptoms on 9/7/2023 and had an x-ray showing possible avascular necrosis. She was discharged with orthopedic surgery follow-up. She states that the surgeon she was sent to does not specialize in hips. Differentials include but not limited to avascular necrosis, fracture, osteoarthritis. Less likely septic arthritis as the patient has no fever, no erythema or induration at the area of the right hip, no recent surgical procedures. At the time of my exam the patient is resting comfortably in a chair in no acute distress. Her vitals are unremarkable. She is afebrile. Exam shows tenderness to the right hip with decreased range of motion flexing the hip. Distal pulses present in right foot. She has mild decreased range of motion at the knee but is able to flex and extend knee under her own power. She has full range of motion of right ankle. She has no tenderness to the right knee or the right ankle. Heart is regular rate and rhythm, lungs clear to auscultation bilaterally, abdomen soft and nontender. She has no other musculoskeletal findings. Patient is still able to ambulate. CBC is unremarkable with no elevation of WBC. BMP is unremarkable. CT of her right hip shows no acute fracture. She is end-stage right hip severe osteoarthritis. No evidence of severe osteonecrosis. She was given Toradol and Norflex for symptomatic control with improvement of her symptoms.   On reexamination she is resting comfortably in

## 2023-09-15 NOTE — ED NOTES
Oni Garcia,  called wants pt to call if she needs anything brought to hospital. DARRON Vaughn  09/15/23 6027

## 2023-09-16 NOTE — ED NOTES
Pt yelling/cussing at RN after signing discharge papers because she missed lunch and dinner and \"knew this was going to happen\". This RN offered her food and pt stated \"why the f**k would I want a sandwich\". Pt walked to waiting room with cane. Ambulatory w/o RN assistance. Pt alert and oriented x4.       Carmelita Ochoa RN  09/15/23 6582

## 2023-09-26 ENCOUNTER — TELEPHONE (OUTPATIENT)
Dept: ORTHOPEDIC SURGERY | Age: 53
End: 2023-09-26

## 2023-09-26 NOTE — TELEPHONE ENCOUNTER
I attempted to call patient to schedule an appt for referral placed by Goldie HAMM. I left a voicemail advising to call the office to schedule an appt.                           Referral reason: Severe right hip OA